# Patient Record
Sex: MALE | URBAN - METROPOLITAN AREA
[De-identification: names, ages, dates, MRNs, and addresses within clinical notes are randomized per-mention and may not be internally consistent; named-entity substitution may affect disease eponyms.]

---

## 2021-09-24 ENCOUNTER — APPOINTMENT (RX ONLY)
Dept: URBAN - METROPOLITAN AREA CLINIC 53 | Facility: CLINIC | Age: 36
Setting detail: DERMATOLOGY
End: 2021-09-24

## 2021-09-24 DIAGNOSIS — D22 MELANOCYTIC NEVI: ICD-10-CM | Status: STABLE

## 2021-09-24 DIAGNOSIS — L71.8 OTHER ROSACEA: ICD-10-CM | Status: INADEQUATELY CONTROLLED

## 2021-09-24 DIAGNOSIS — D18.0 HEMANGIOMA: ICD-10-CM | Status: STABLE

## 2021-09-24 DIAGNOSIS — L91.8 OTHER HYPERTROPHIC DISORDERS OF THE SKIN: ICD-10-CM | Status: STABLE

## 2021-09-24 DIAGNOSIS — L30.1 DYSHIDROSIS [POMPHOLYX]: ICD-10-CM | Status: INADEQUATELY CONTROLLED

## 2021-09-24 PROBLEM — D22.5 MELANOCYTIC NEVI OF TRUNK: Status: ACTIVE | Noted: 2021-09-24

## 2021-09-24 PROBLEM — D22.4 MELANOCYTIC NEVI OF SCALP AND NECK: Status: ACTIVE | Noted: 2021-09-24

## 2021-09-24 PROBLEM — D18.01 HEMANGIOMA OF SKIN AND SUBCUTANEOUS TISSUE: Status: ACTIVE | Noted: 2021-09-24

## 2021-09-24 PROCEDURE — ? PRESCRIPTION

## 2021-09-24 PROCEDURE — ? RECOMMENDATIONS

## 2021-09-24 PROCEDURE — 99203 OFFICE O/P NEW LOW 30 MIN: CPT

## 2021-09-24 PROCEDURE — ? COUNSELING

## 2021-09-24 RX ORDER — SODIUM SULFACETAMIDE AND SULFUR 80; 40 MG/ML; MG/ML
SOLUTION TOPICAL
Qty: 473 | Refills: 0 | Status: ERX | COMMUNITY
Start: 2021-09-24

## 2021-09-24 RX ORDER — FLUOCINONIDE 1 MG/G
CREAM TOPICAL
Qty: 120 | Refills: 0 | Status: ERX | COMMUNITY
Start: 2021-09-24

## 2021-09-24 RX ORDER — MINOCYCLINE HYDROCHLORIDE 50 MG/1
TABLET ORAL
Qty: 30 | Refills: 0 | Status: ERX | COMMUNITY
Start: 2021-09-24

## 2021-09-24 RX ADMIN — FLUOCINONIDE: 1 CREAM TOPICAL at 00:00

## 2021-09-24 RX ADMIN — SODIUM SULFACETAMIDE AND SULFUR: 80; 40 SOLUTION TOPICAL at 00:00

## 2021-09-24 RX ADMIN — MINOCYCLINE HYDROCHLORIDE: 50 TABLET ORAL at 00:00

## 2021-09-24 ASSESSMENT — LOCATION ZONE DERM
LOCATION ZONE: FACE
LOCATION ZONE: TRUNK
LOCATION ZONE: NECK
LOCATION ZONE: FINGER
LOCATION ZONE: SCALP

## 2021-09-24 ASSESSMENT — LOCATION SIMPLE DESCRIPTION DERM
LOCATION SIMPLE: RIGHT LOWER BACK
LOCATION SIMPLE: LEFT UPPER BACK
LOCATION SIMPLE: LEFT CHEEK
LOCATION SIMPLE: RIGHT CHEEK
LOCATION SIMPLE: LEFT ANTERIOR NECK
LOCATION SIMPLE: LEFT MIDDLE FINGER
LOCATION SIMPLE: SCALP

## 2021-09-24 ASSESSMENT — LOCATION DETAILED DESCRIPTION DERM
LOCATION DETAILED: LEFT DISTAL PALMAR MIDDLE FINGER
LOCATION DETAILED: RIGHT CENTRAL PARIETAL SCALP
LOCATION DETAILED: LEFT CENTRAL MALAR CHEEK
LOCATION DETAILED: LEFT CLAVICULAR NECK
LOCATION DETAILED: LEFT INFERIOR UPPER BACK
LOCATION DETAILED: RIGHT SUPERIOR MEDIAL MIDBACK
LOCATION DETAILED: RIGHT CENTRAL MALAR CHEEK

## 2021-09-24 NOTE — HPI: DRY SKIN
How Severe Is Your Dry Skin?: moderate
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

## 2021-09-24 NOTE — PROCEDURE: RECOMMENDATIONS
Render Risk Assessment In Note?: no
Recommendation Preamble: The following recommendations were made during the visit:
Recommendations (Free Text): Elta MD sunscreen spf 46\\nCerave ultra moisturizer with spf
Detail Level: Zone

## 2021-09-24 NOTE — HPI: SECONDARY COMPLAINT
How Severe Is This Condition?: moderate
Additional History: Patient states he believes he may have rosacea.